# Patient Record
Sex: FEMALE | Race: WHITE
[De-identification: names, ages, dates, MRNs, and addresses within clinical notes are randomized per-mention and may not be internally consistent; named-entity substitution may affect disease eponyms.]

---

## 2019-09-11 ENCOUNTER — HOSPITAL ENCOUNTER (EMERGENCY)
Dept: HOSPITAL 39 - ER | Age: 29
Discharge: HOME | End: 2019-09-11
Payer: OTHER GOVERNMENT

## 2019-09-11 VITALS — TEMPERATURE: 98.4 F | SYSTOLIC BLOOD PRESSURE: 132 MMHG | OXYGEN SATURATION: 99 % | DIASTOLIC BLOOD PRESSURE: 80 MMHG

## 2019-09-11 DIAGNOSIS — R51: Primary | ICD-10-CM

## 2019-09-11 PROCEDURE — 81025 URINE PREGNANCY TEST: CPT

## 2019-09-11 PROCEDURE — 36415 COLL VENOUS BLD VENIPUNCTURE: CPT

## 2019-09-11 NOTE — ED.PDOC
History of Present Illness





- General


Chief Complaint: Headache


Stated Complaint: headache


Time Seen by Provider: 09/11/19 21:01


Source: patient, family





- History of Present Illness


Initial Comments: 





30 yo otherwise healthy F who presents for HA, onset 20 days ago, pressure, 

starts from the back of her head, radiates up, becomes tolerable with motrin, 

however tonight was intolerable and presented to the ED. Family member, that 

lives outside the household, had viral meningitis two weeks ago. Pt has been 

under a lot of stress recently. Intermittent RUE tingling when wakes up in the 

morning from sleeping on that side and resolves on its own, no numbness or 

tingling currently, has seen a chiropractor in the past for sx, no recent 

manipulation or trauma prior to onset of headache. Denies f/c, cough, 

congestion, neck pain, neck stiffness, weakness, numbness, recent travel, n/v, 

change in vision. 


Allergies/Adverse Reactions: 


Allergies





NO KNOWN ALLERGY Allergy (Verified 09/11/19 21:14)


   





Home Medications: 


Ambulatory Orders





Amphetamine-Dextroamphetamine [Adderall Xr 20 mg] 1 cap PO DAILY 09/11/19 


Butalbital-Acetaminophen-Caffe [Fioricet] 1 cap PO Q6HR PRN #12 cap 09/11/19 











Review of Systems





- Review of Systems


Constitutional: Denies: chills, fever


EENTM: Denies: blurred vision, double vision


Respiratory: Denies: cough, short of breath


Cardiology: Denies: chest pain, palpitations


Gastrointestinal/Abdominal: Denies: abdominal pain, diarrhea, nausea


Genitourinary: Denies: dysuria, hematuria


Musculoskeletal: Denies: back pain, muscle pain, muscle stiffness, neck pain


Skin: Denies: lesions, rash


Neurological: States: headache.  Denies: numbness, weakness


Hematologic/Lymphatic: Denies: easy bruising, swollen glands





Past Medical History (General)





- Patient Medical History


Hx Seizures: No


Hx Stroke: No


Hx Dementia: No


Hx Asthma: No


Hx of COPD: No


Hx Cardiac Disorders: No


Hx Congestive Heart Failure: No


Hx Diabetes: No


Hx Gastroesophageal Reflux: No


Hx Renal Disease: No


Surgical History: no surgical history





- Vaccination History


Hx Tetanus, Diphtheria Vaccination: Yes


Hx Influenza Vaccination: Yes


Hx Pneumococcal Vaccination: No


Immunizations Up to Date: Yes





- Social History


Hx Tobacco Use: No


Hx Alcohol Use: No


Hx Substance Use: No


Hx Depression: No





- Activities of Daily Living


Hospice Agency (if applicable):: None





- Female History


Patient is a Female of Child Bearing Age (10 -59 yrs old): Yes


Patient Pregnant: No





- Triage Comment


ED Triage Comment: pt voices that she has had a headache for 20 days, using 

ibuprofen to manage the pain. pt voices occasional vision spots and occasional 

dizziness.





Family Medical History





- Family History


  ** Father


Hx Family Hypertension: Yes





Physical Exam





- Physical Exam


General Appearance: Alert, No apparent distress, Well Developed, Well Groomed, 

Well Hydrated, Well Nourished


Eyes, Ears, Nose, Throat Exam: PERRL/EOMI, other - No photophobia


Neck: non-tender, full range of motion, supple, normal inspection


Cardiovascular/Chest: normal peripheral pulses, regular rate, rhythm, no edema, 

no gallop, no JVD, no murmur


Respiratory: lungs clear, normal breath sounds, no respiratory distress, no 

accessory muscle use


Gastrointestinal/Abdominal: non tender, soft


Back Exam: normal inspection, no CVA tenderness, no vertebral tenderness


Extremity: normal range of motion, normal inspection


Mental Status: alert, oriented x 3


CNs Exam: normal speech, other - No CN deficits


Coordination/Gait: normal finger to nose, normal gait


Motor/Sensory: no motor deficit, no sensory deficit, other - Normal reflexes


Skin Exam: warm/dry, normal color





Progress





- Progress


Progress: 





09/11/19 21:21


Discussed with pt treatment with headache cocktail, through shared decision 

making will hold off on any further testing at this time in light of normal exam

 and no red flag sx.





09/11/19 22:05


Pt rechecked, doing well, headache completely resolved. I have explained and 

reviewed all results with the pt. I explained that emergent conditions may


arise and to return to the ER for new, worsening, or any persistent conditions. 

I've explained the importance of f/u for recheck. All questions and concerns 

addressed at this time. Pt understands and agrees with plan. Pt well appearing, 

NAD, is stable for discharge.





Arabella Wise MD


Emergency Medicine Physician


Billing Number [id number]








- Results/Orders


Results/Orders: 





                                        





09/11/19 21:11


Sodium Chloride 0.9% 1000ML [Ns 1000 ml] 1,000 ml IVS .QD 








                         Laboratory Results - last 24 hr











  09/11/19





  21:00


 


Urine HCG, Qual  Negative














Departure





- Departure


Clinical Impression: 


Headache


Qualifiers:


 Headache chronicity pattern: acute headache Intractability: not intractable 





Time of Disposition: 22:38


Disposition: Discharge to Home or Self Care


Health Concerns: 


Condition: Fair


Departure Forms:  ED Discharge - Pt. Copy, Patient Portal Self Enrollment


Instructions:  DI for Headache


Referrals: 


MAICO JIANG [Primary Care Provider] - 1-2 Weeks


Prescriptions: 


Butalbital-Acetaminophen-Caffe [Fioricet] 1 cap PO Q6HR PRN #12 cap


 PRN Reason: Headache/Migraine Pain


Home Medications: 


Ambulatory Orders





Amphetamine-Dextroamphetamine [Adderall Xr 20 mg] 1 cap PO DAILY 09/11/19 


Butalbital-Acetaminophen-Caffe [Fioricet] 1 cap PO Q6HR PRN #12 cap 09/11/19 








Additional Instructions: 


Follow up: 


[facility]


As needed, if symptoms worsen